# Patient Record
Sex: MALE | Race: WHITE | NOT HISPANIC OR LATINO
[De-identification: names, ages, dates, MRNs, and addresses within clinical notes are randomized per-mention and may not be internally consistent; named-entity substitution may affect disease eponyms.]

---

## 2019-03-01 ENCOUNTER — APPOINTMENT (OUTPATIENT)
Dept: ORTHOPEDIC SURGERY | Facility: CLINIC | Age: 69
End: 2019-03-01

## 2019-04-18 ENCOUNTER — OUTPATIENT (OUTPATIENT)
Dept: OUTPATIENT SERVICES | Facility: HOSPITAL | Age: 69
LOS: 1 days | End: 2019-04-18
Payer: MEDICARE

## 2019-04-18 ENCOUNTER — APPOINTMENT (OUTPATIENT)
Dept: ORTHOPEDIC SURGERY | Facility: CLINIC | Age: 69
End: 2019-04-18
Payer: MEDICARE

## 2019-04-18 VITALS
OXYGEN SATURATION: 97 % | BODY MASS INDEX: 31.07 KG/M2 | WEIGHT: 205 LBS | HEART RATE: 59 BPM | SYSTOLIC BLOOD PRESSURE: 130 MMHG | HEIGHT: 68 IN | DIASTOLIC BLOOD PRESSURE: 80 MMHG

## 2019-04-18 DIAGNOSIS — Z82.49 FAMILY HISTORY OF ISCHEMIC HEART DISEASE AND OTHER DISEASES OF THE CIRCULATORY SYSTEM: ICD-10-CM

## 2019-04-18 DIAGNOSIS — M17.12 UNILATERAL PRIMARY OSTEOARTHRITIS, LEFT KNEE: ICD-10-CM

## 2019-04-18 DIAGNOSIS — Z87.19 PERSONAL HISTORY OF OTHER DISEASES OF THE DIGESTIVE SYSTEM: ICD-10-CM

## 2019-04-18 DIAGNOSIS — Z86.69 PERSONAL HISTORY OF OTHER DISEASES OF THE NERVOUS SYSTEM AND SENSE ORGANS: ICD-10-CM

## 2019-04-18 DIAGNOSIS — Z87.09 PERSONAL HISTORY OF OTHER DISEASES OF THE RESPIRATORY SYSTEM: ICD-10-CM

## 2019-04-18 DIAGNOSIS — Z87.891 PERSONAL HISTORY OF NICOTINE DEPENDENCE: ICD-10-CM

## 2019-04-18 DIAGNOSIS — M84.452A PATHOLOGICAL FRACTURE, LEFT FEMUR, INITIAL ENCOUNTER FOR FRACTURE: ICD-10-CM

## 2019-04-18 PROCEDURE — 72170 X-RAY EXAM OF PELVIS: CPT

## 2019-04-18 PROCEDURE — 73564 X-RAY EXAM KNEE 4 OR MORE: CPT

## 2019-04-18 PROCEDURE — 72170 X-RAY EXAM OF PELVIS: CPT | Mod: 26

## 2019-04-18 PROCEDURE — 73564 X-RAY EXAM KNEE 4 OR MORE: CPT | Mod: 26,50

## 2019-04-18 PROCEDURE — 99203 OFFICE O/P NEW LOW 30 MIN: CPT

## 2019-04-18 NOTE — REVIEW OF SYSTEMS
[Lower Ext Edema] : lower extremity edema [Recent Weight Gain (___ Lbs)] : recent ~M [unfilled] lbs weight gain [Negative] : Endocrine [FreeTextEntry2] : falls

## 2019-04-18 NOTE — HISTORY OF PRESENT ILLNESS
[Worsening] : worsening [___ mths] : [unfilled] month(s) ago [Sitting] : sitting [Standing] : standing [Constant] : ~He/She~ states the symptoms seem to be constant [Walking] : worsened by walking [None] : No relieving factors are noted [NSAIDs] : not relieved by nonsteroidal anti-inflammatory drugs [de-identified] : 68 year old male presents today for initial evaluation of left knee pain that began 3 months ago and was getting progressively worse until some improvement over the last few days. He reports that he fell on his knee 5 months ago in November 2018 but the pain did not begin until 3 months ago so he is unsure if the pain is related to the injury. He had an MRI of the left knee done by his PCP, which demonstrated a joint effusion. He reports moderate, continual left knee pain with stair use, down worse than up, as well as mild stiffness and instability on uneven ground. He can walk greater than 10 blocks and uses a rail to ascend and descend stairs. Patient reports that the pain limits his ability to exercise. Ibuprofen helps a lot to relieve pain which he takes PRN. Bracing and acetaminophen help a little whereas naproxen does not help.\par Of note, patient has a h/o HTN, sleep apnea, GERD, pneumonia, gastritis, stomach ulcer, sinusitis and kidney stone. 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

## 2019-04-18 NOTE — DISCUSSION/SUMMARY
[de-identified] : We discussed the diagnosis and prognosis of the patient's condition. Non surgical treatment options include physical therapy and low impact exercise, weight loss for those who are overweight, NSAID and analgesic use, joint injections and activity modification including the use of assistive devices. I advised Mr. Bender to take it easy for the next month and I demonstrated some low impact exercises he can do by himself at home. I also wrote a physical therapy prescription that Mr. Bender can use in a month when his injury has settled down.\par Follow up in 1 month if he's unsure if he should start physical therapy or 3 months for follow up.

## 2019-04-18 NOTE — END OF VISIT
[FreeTextEntry3] : All medical record entries made by the Scribe were at my, Dr. Vuong's, discretion and personally dictated by me on 04/18/2019. I have reviewed the chart and agree that the record accurately reflects my personal performance of the history, physical exam, assessment and plan. I have also personally directed, reviewed and agreed to the chart.

## 2019-04-18 NOTE — PHYSICAL EXAM
[de-identified] : Constitutional: Well appearing. No acute distress.\par Mental Status: Alert & oriented to person, place and time. Normal affect.\par Pulmonary: No respiratory distress. Normal chest excursion.\par \par Gait: Normal.\par Ambulatory assist devices: None.\par \par Cervical spine: Skin intact. No visible deformity. Painless active ROM without evident restriction.\par Bilateral upper extremities: Skin intact. No deformity. Painless active ROM without evident restriction.\par Thoracolumbar spine: No deformity. No tenderness. No radicular pain on passive straight leg raise bilaterally.\par \par Pelvis: No pelvic obliquity. No tenderness.\par Leg lengths: Equal.\par Bilateral Hips: No swelling or deformity. No focal tenderness. Painless and unrestricted range of motion. No crepitation.\par \par Right Knee:\par Skin intact.\par No surgical scars.\par No erythema or ecchymosis.\par No swelling or effusion.\par No deformity.\par No focal tenderness.\par Painless and unrestricted range of motion. \par Central patellar tracking.\par No crepitation.\par No instability.\par \par Left Knee:\par Skin intact.\par No surgical scars.\par No erythema or ecchymosis.\par (+) effusion.\par No deformity.\par (+) medial joint line tenderness.\par Painless ROM from full extension to 120 degrees of flexion.\par Central patellar tracking.\par No crepitation.\par Good stability.\par \par Neurological: Intact distal crude touch sensation. Normal distal motor power. \par Cardiovascular: Palpable dorsalis pedis and posterior tibialis pulses. Brisk capillary refill. No peripheral edema.\par Lymphatics: No peripheral adenopathy appreciated. [de-identified] : X-ray imaging of the left knee done here today demonstrates moderate joint space narrowing in medial compartment\par \par MRI done at [facility] on 2/21/19 demonstrates:\par Impression\par 1. 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 The lateral compartment is relatively preserved.\par 2. Mild arthrosis in the patellofemoral compartment, as described above.\par 3. Moderate to large joint effusion.\par

## 2019-06-04 ENCOUNTER — APPOINTMENT (OUTPATIENT)
Dept: ORTHOPEDIC SURGERY | Facility: CLINIC | Age: 69
End: 2019-06-04

## 2019-10-31 ENCOUNTER — APPOINTMENT (OUTPATIENT)
Dept: OTOLARYNGOLOGY | Facility: CLINIC | Age: 69
End: 2019-10-31

## 2023-06-13 ENCOUNTER — APPOINTMENT (OUTPATIENT)
Dept: OTOLARYNGOLOGY | Facility: CLINIC | Age: 73
End: 2023-06-13
Payer: MEDICARE

## 2023-06-13 VITALS — WEIGHT: 220 LBS | BODY MASS INDEX: 32.58 KG/M2 | HEIGHT: 69 IN

## 2023-06-13 DIAGNOSIS — H61.21 IMPACTED CERUMEN, RIGHT EAR: ICD-10-CM

## 2023-06-13 DIAGNOSIS — H93.291 OTHER ABNORMAL AUDITORY PERCEPTIONS, RIGHT EAR: ICD-10-CM

## 2023-06-13 PROCEDURE — 99203 OFFICE O/P NEW LOW 30 MIN: CPT | Mod: 25

## 2023-06-13 PROCEDURE — 69210 REMOVE IMPACTED EAR WAX UNI: CPT

## 2023-06-13 NOTE — ASSESSMENT
[FreeTextEntry1] : Cerumen impaction was removed.  He felt better afterwards.\par \par Plan\par -Findings and management options were discussed with the patient.\par -Continue good ear hygiene\par -Avoid using cotton swabs in the ears\par -He declined an audiogram to check his hearing\par -Follow-up in 1 year or earlier if needed to check his ears

## 2023-06-13 NOTE — HISTORY OF PRESENT ILLNESS
[de-identified] : GARRETT VALLE is a 72 year old patient here for ear fullness and cerumen impaction.  He has a history of wax buildup.  He has had intermittent pressure and fullness in the right ear.  He has no otalgia, otorrhea, tinnitus, dizziness.  He was last seen prior to the pandemic